# Patient Record
Sex: FEMALE | Race: WHITE | NOT HISPANIC OR LATINO | Employment: FULL TIME | URBAN - METROPOLITAN AREA
[De-identification: names, ages, dates, MRNs, and addresses within clinical notes are randomized per-mention and may not be internally consistent; named-entity substitution may affect disease eponyms.]

---

## 2020-10-14 ENCOUNTER — APPOINTMENT (OUTPATIENT)
Dept: WOMENS IMAGING | Facility: HOSPITAL | Age: 40
End: 2020-10-14

## 2020-10-14 PROCEDURE — 77067 SCR MAMMO BI INCL CAD: CPT | Performed by: RADIOLOGY

## 2020-10-14 PROCEDURE — 77063 BREAST TOMOSYNTHESIS BI: CPT | Performed by: RADIOLOGY

## 2020-10-27 ENCOUNTER — APPOINTMENT (OUTPATIENT)
Dept: WOMENS IMAGING | Facility: HOSPITAL | Age: 40
End: 2020-10-27

## 2020-10-27 PROCEDURE — 77061 BREAST TOMOSYNTHESIS UNI: CPT | Performed by: RADIOLOGY

## 2020-10-27 PROCEDURE — 77065 DX MAMMO INCL CAD UNI: CPT | Performed by: RADIOLOGY

## 2020-10-27 PROCEDURE — 76641 ULTRASOUND BREAST COMPLETE: CPT | Performed by: RADIOLOGY

## 2020-12-14 ENCOUNTER — OFFICE VISIT (OUTPATIENT)
Dept: NEUROSURGERY | Facility: CLINIC | Age: 40
End: 2020-12-14

## 2020-12-14 VITALS
TEMPERATURE: 97.8 F | HEIGHT: 62 IN | SYSTOLIC BLOOD PRESSURE: 110 MMHG | WEIGHT: 154 LBS | DIASTOLIC BLOOD PRESSURE: 70 MMHG | BODY MASS INDEX: 28.34 KG/M2

## 2020-12-14 DIAGNOSIS — M50.30 DEGENERATIVE DISC DISEASE, CERVICAL: Primary | ICD-10-CM

## 2020-12-14 DIAGNOSIS — M51.36 DEGENERATIVE DISC DISEASE, LUMBAR: ICD-10-CM

## 2020-12-14 PROCEDURE — 99203 OFFICE O/P NEW LOW 30 MIN: CPT | Performed by: NEUROLOGICAL SURGERY

## 2020-12-14 RX ORDER — DULOXETIN HYDROCHLORIDE 60 MG/1
60 CAPSULE, DELAYED RELEASE ORAL DAILY
COMMUNITY
Start: 2020-10-20

## 2020-12-14 RX ORDER — HYDROXYCHLOROQUINE SULFATE 200 MG/1
TABLET, FILM COATED ORAL DAILY
COMMUNITY

## 2020-12-14 RX ORDER — OMEPRAZOLE 40 MG/1
40 CAPSULE, DELAYED RELEASE ORAL DAILY
COMMUNITY

## 2020-12-14 RX ORDER — TOFACITINIB 11 MG/1
TABLET, FILM COATED, EXTENDED RELEASE ORAL
COMMUNITY
Start: 2020-12-01

## 2020-12-14 RX ORDER — GABAPENTIN 300 MG/1
300 CAPSULE ORAL 3 TIMES DAILY
COMMUNITY

## 2020-12-14 RX ORDER — TIZANIDINE 4 MG/1
4 TABLET ORAL NIGHTLY PRN
COMMUNITY

## 2020-12-14 NOTE — PROGRESS NOTES
Jaja Sanchez  1980  [id number]      Chief Complaint   Patient presents with   • Back Pain   • Neck Pain       HISTORY OF PRESENT ILLNESS: This is a 40-year-old female who has a genetic predisposition for degenerative osteoarthritis presenting with a history of chronic neck and low back pain.  The pain in the cervical area is located intrascapular and into the right shoulder.  She does not have symptoms in the upper extremities.  It is worse with prolonged sitting.  She also has pain in the lumbar area rating into both hips.  She has been to chiropractic which only gives temporary relief.  She is under care of rheumatologist who states that she has SLE.  She is on Celebrex.    Cervical MRI was performed she referred for neurosurgical consultation.    Past Medical History:   Diagnosis Date   • Anemia    • Arthritis    • Asthma    • Bronchitis    • Headache    • Pneumonia        Past Surgical History:   Procedure Laterality Date   • CERVICAL BIOPSY     •  SECTION     • HYSTERECTOMY         Family History   Problem Relation Age of Onset   • Arthritis Mother        Social History     Socioeconomic History   • Marital status:      Spouse name: Not on file   • Number of children: Not on file   • Years of education: Not on file   • Highest education level: Not on file   Tobacco Use   • Smoking status: Current Every Day Smoker     Packs/day: 0.50     Start date: 2000   • Smokeless tobacco: Never Used   Substance and Sexual Activity   • Alcohol use: Never     Frequency: Never   • Drug use: Never   • Sexual activity: Defer       No Known Allergies      Current Outpatient Medications:   •  DULoxetine (CYMBALTA) 60 MG capsule, Take 60 mg by mouth Daily., Disp: , Rfl:   •  gabapentin (NEURONTIN) 300 MG capsule, Take 300 mg by mouth 3 (Three) Times a Day., Disp: , Rfl:   •  hydroxychloroquine (PLAQUENIL) 200 MG tablet, Take  by mouth Daily., Disp: , Rfl:   •  omeprazole (priLOSEC) 40 MG capsule,  Take 40 mg by mouth Daily., Disp: , Rfl:   •  tiZANidine (ZANAFLEX) 4 MG tablet, Take 4 mg by mouth At Night As Needed for Muscle Spasms., Disp: , Rfl:   •  Xeljanz XR 11 MG tablet sustained-release 24 hour, , Disp: , Rfl:     Review of Systems   Constitutional: Positive for fatigue. Negative for activity change, appetite change, chills, diaphoresis, fever and unexpected weight change.   HENT: Negative for congestion, dental problem, drooling, ear discharge, ear pain, facial swelling, hearing loss, mouth sores, nosebleeds, postnasal drip, rhinorrhea, sinus pressure, sinus pain, sneezing, sore throat, tinnitus, trouble swallowing and voice change.    Eyes: Negative for photophobia, pain, discharge, redness, itching and visual disturbance.   Respiratory: Negative for apnea, cough, choking, chest tightness, shortness of breath, wheezing and stridor.    Cardiovascular: Negative for chest pain, palpitations and leg swelling.   Gastrointestinal: Negative for abdominal distention, abdominal pain, anal bleeding, blood in stool, constipation, diarrhea, nausea, rectal pain and vomiting.   Endocrine: Positive for cold intolerance. Negative for heat intolerance, polydipsia, polyphagia and polyuria.   Genitourinary: Negative for decreased urine volume, difficulty urinating, dyspareunia, dysuria, enuresis, flank pain, frequency, genital sores, hematuria, menstrual problem, pelvic pain, urgency, vaginal bleeding, vaginal discharge and vaginal pain.   Musculoskeletal: Positive for back pain, joint swelling, neck pain and neck stiffness. Negative for arthralgias, gait problem and myalgias.   Skin: Negative for color change, pallor, rash and wound.   Allergic/Immunologic: Positive for immunocompromised state. Negative for environmental allergies and food allergies.   Neurological: Negative for dizziness, tremors, seizures, syncope, facial asymmetry, speech difficulty, weakness, light-headedness, numbness and headaches.  "  Hematological: Negative for adenopathy. Does not bruise/bleed easily.   Psychiatric/Behavioral: Negative for agitation, behavioral problems, confusion, decreased concentration, dysphoric mood, hallucinations, self-injury, sleep disturbance and suicidal ideas. The patient is nervous/anxious. The patient is not hyperactive.    All other systems reviewed and are negative.      Vitals:    12/14/20 1202   BP: 110/70   BP Location: Right arm   Patient Position: Sitting   Cuff Size: Adult   Temp: 97.8 °F (36.6 °C)   TempSrc: Infrared   Weight: 69.9 kg (154 lb)   Height: 157.5 cm (62\")       Neurological Examination:    Mental status/speech: The patient is alert and oriented.  Speech is clear without aphysia or dysarthria.  No overt cognitive deficits.    Cranial nerve examination:    Olfaction: Smell is intact.  Vision: Vision is intact without visual field abnormalities.  Funduscopic examination is normal.  No pupillary irregularity.  Ocular motor examination: The extraocular muscles are intact.  There is no diplopia.  The pupil is round and reactive to both light and accommodation.  There is no nystagmus.  Facial movement/sensation: There is no facial weakness.  Sensation is intact in the first, second, and third divisions of the trigeminal nerve.  The corneal reflex is intact.  Auditory: Hearing is intact to finger rub bilaterally.  Cranial nerves IX, X, XI, XII: Phonation is normal.  No dysphagia.  Tongue is protruded in the midline without atrophy.  The gag reflex is intact.  Shoulder shrug is normal.    Musculoligamentous ligamentous examination: BMI 28.1; weight 154.  She has limited range of motion of the cervical spine.  There is no weakness, sensory loss or reflex asymmetry.  Straight leg raising, Lasègue and flip test all negative.  Gait normal.  No Babinski Dano or clonus.    Medical Decision Making:     Diagnostic Data Set: Cervical MRI shows reversal of the normal curvature with degenerative disc " disease primarily at C5-6.  Other spaces are involved.  There is no compromise of the canal or neuroforamen      Assessment: Symptomatic degenerative osteoarthritis of the cervical spine          Recommendations: I have sent her to physical therapy for education, Maryse program and ergonomic evaluation of her workstation.  I have also scheduled a lumbar MRI and will ask her to return to see me on the same date.  I will keep you informed and appreciate seeing her.  I do not find any abnormality for which surgery would be prescribed.        I greatly appreciate the opportunity to see and evaluate this individual.  If you have questions or concerns regarding issues that I may have overlooked please call me at any time: [phone].  Blaine Rubalcava M.D.  [facility]  [address] [place], [place] [address] .  [facility]  [id number]

## 2020-12-28 ENCOUNTER — OFFICE VISIT (OUTPATIENT)
Dept: NEUROSURGERY | Facility: CLINIC | Age: 40
End: 2020-12-28

## 2020-12-28 VITALS
SYSTOLIC BLOOD PRESSURE: 120 MMHG | DIASTOLIC BLOOD PRESSURE: 70 MMHG | WEIGHT: 155 LBS | TEMPERATURE: 97.7 F | HEIGHT: 62 IN | BODY MASS INDEX: 28.52 KG/M2

## 2020-12-28 DIAGNOSIS — M51.36 DEGENERATIVE DISC DISEASE, LUMBAR: Primary | ICD-10-CM

## 2020-12-28 DIAGNOSIS — M50.30 DEGENERATIVE DISC DISEASE, CERVICAL: ICD-10-CM

## 2020-12-28 PROCEDURE — 99212 OFFICE O/P EST SF 10 MIN: CPT | Performed by: NEUROLOGICAL SURGERY

## 2020-12-28 NOTE — PROGRESS NOTES
Jaja Sanchez  1980  [id number]                        CHIEF COMPLAINT: Cervical and back pain         MEDICAL HISTORY SINCE LAST ENCOUNTER: 40-year-old genetic predisposition for degenerative arthritis reports today for follow-up and review of lumbar MRI.  He is to begin her physical therapy in a.m.           Past Medical History:   Diagnosis Date   • Anemia    • Arthritis    • Asthma    • Bronchitis    • Headache    • Pneumonia               Past Surgical History:   Procedure Laterality Date   • CERVICAL BIOPSY     •  SECTION     • HYSTERECTOMY                Family History   Problem Relation Age of Onset   • Arthritis Mother               Social History     Socioeconomic History   • Marital status:      Spouse name: Not on file   • Number of children: Not on file   • Years of education: Not on file   • Highest education level: Not on file   Tobacco Use   • Smoking status: Current Every Day Smoker     Packs/day: 0.50     Start date: 2000   • Smokeless tobacco: Never Used   Substance and Sexual Activity   • Alcohol use: Never     Frequency: Never   • Drug use: Never   • Sexual activity: Defer            No Known Allergies           Current Outpatient Medications:   •  DULoxetine (CYMBALTA) 60 MG capsule, Take 60 mg by mouth Daily., Disp: , Rfl:   •  gabapentin (NEURONTIN) 300 MG capsule, Take 300 mg by mouth 3 (Three) Times a Day., Disp: , Rfl:   •  hydroxychloroquine (PLAQUENIL) 200 MG tablet, Take  by mouth Daily., Disp: , Rfl:   •  omeprazole (priLOSEC) 40 MG capsule, Take 40 mg by mouth Daily., Disp: , Rfl:   •  tiZANidine (ZANAFLEX) 4 MG tablet, Take 4 mg by mouth At Night As Needed for Muscle Spasms., Disp: , Rfl:   •  Xeljanz XR 11 MG tablet sustained-release 24 hour, , Disp: , Rfl:          Review of Systems   Constitutional: Positive for fatigue. Negative for activity change, appetite change, chills, diaphoresis, fever and unexpected weight change.   HENT: Positive for  trouble swallowing. Negative for congestion, dental problem, drooling, ear discharge, ear pain, facial swelling, hearing loss, mouth sores, nosebleeds, postnasal drip, rhinorrhea, sinus pressure, sinus pain, sneezing, sore throat, tinnitus and voice change.    Eyes: Negative for photophobia, pain, discharge, redness, itching and visual disturbance.   Respiratory: Negative for apnea, cough, choking, chest tightness, shortness of breath, wheezing and stridor.    Cardiovascular: Negative for chest pain, palpitations and leg swelling.   Gastrointestinal: Negative for abdominal distention, abdominal pain, anal bleeding, blood in stool, constipation, diarrhea, nausea, rectal pain and vomiting.   Endocrine: Positive for cold intolerance. Negative for heat intolerance, polydipsia, polyphagia and polyuria.   Genitourinary: Negative for decreased urine volume, difficulty urinating, dyspareunia, dysuria, enuresis, flank pain, frequency, genital sores, hematuria, menstrual problem, pelvic pain, urgency, vaginal bleeding, vaginal discharge and vaginal pain.   Musculoskeletal: Positive for arthralgias, back pain, joint swelling, myalgias, neck pain and neck stiffness. Negative for gait problem.   Skin: Negative for color change, pallor, rash and wound.   Allergic/Immunologic: Negative for environmental allergies, food allergies and immunocompromised state.   Neurological: Positive for dizziness. Negative for tremors, seizures, syncope, facial asymmetry, speech difficulty, weakness, light-headedness, numbness and headaches.   Hematological: Negative for adenopathy. Does not bruise/bleed easily.   Psychiatric/Behavioral: Negative for agitation, behavioral problems, confusion, decreased concentration, dysphoric mood, hallucinations, self-injury, sleep disturbance and suicidal ideas. The patient is not nervous/anxious and is not hyperactive.    All other systems reviewed and are negative.              Vitals:    12/28/20 0914   BP:  "120/70   BP Location: Right arm   Patient Position: Sitting   Cuff Size: Adult   Temp: 97.7 °F (36.5 °C)   TempSrc: Infrared   Weight: 70.3 kg (155 lb)   Height: 157.5 cm (62\")               EXAMINATION: BMI 28.3; weight 155 pounds.  She has limited range of motion otherwise exam essentially normal.  No weakness, sensory loss or reflex asymmetry.  Gait is normal.  No Babinski Dano or clonus            MEDICAL DECISION MAKING: Degenerative osteoarthritis of the cervical lumbar spine           ASSESSMENT/DISPOSITION: [The lumbar MRI shows mild degenerative change.  She does not have an abnormality for which surgery is considered.  I have asked her to call me in 2 weeks to inform me of her progress.  I will certainly keep you informed thank you for allow me to see her.              I APPRECIATE THE OPPORTUNITY OF THIS REFERRAL. PLEASE CALL IF ANY       QUESTIONS [phone]          "

## 2020-12-28 NOTE — PATIENT INSTRUCTIONS
Call Dr. Rubalcava on a Monday or Tuesday (ask for Vilma or Deborah) and leave a message.     Dr. Rubalcava will call you back at the end of the day as soon as he can.     [phone] Vilma    [phone] Deborah Cyr    Call 2-3 weeks

## 2021-03-23 ENCOUNTER — BULK ORDERING (OUTPATIENT)
Dept: CASE MANAGEMENT | Facility: OTHER | Age: 41
End: 2021-03-23

## 2021-03-23 DIAGNOSIS — Z23 IMMUNIZATION DUE: ICD-10-CM

## 2021-04-26 ENCOUNTER — APPOINTMENT (OUTPATIENT)
Dept: WOMENS IMAGING | Facility: HOSPITAL | Age: 41
End: 2021-04-26

## 2021-04-26 PROCEDURE — 77065 DX MAMMO INCL CAD UNI: CPT | Performed by: RADIOLOGY

## 2021-04-26 PROCEDURE — 77061 BREAST TOMOSYNTHESIS UNI: CPT | Performed by: RADIOLOGY

## 2021-04-26 PROCEDURE — 76641 ULTRASOUND BREAST COMPLETE: CPT | Performed by: RADIOLOGY

## 2022-10-27 ENCOUNTER — APPOINTMENT (OUTPATIENT)
Dept: WOMENS IMAGING | Facility: HOSPITAL | Age: 42
End: 2022-10-27

## 2022-10-27 PROCEDURE — 76642 ULTRASOUND BREAST LIMITED: CPT | Performed by: RADIOLOGY

## 2022-10-27 PROCEDURE — 77067 SCR MAMMO BI INCL CAD: CPT | Performed by: RADIOLOGY
